# Patient Record
Sex: FEMALE | Race: WHITE | NOT HISPANIC OR LATINO | URBAN - METROPOLITAN AREA
[De-identification: names, ages, dates, MRNs, and addresses within clinical notes are randomized per-mention and may not be internally consistent; named-entity substitution may affect disease eponyms.]

---

## 2023-08-14 ENCOUNTER — ON CAMPUS - OUTPATIENT (OUTPATIENT)
Dept: URBAN - METROPOLITAN AREA HOSPITAL 105 | Facility: HOSPITAL | Age: 83
End: 2023-08-14
Payer: MEDICARE

## 2023-08-14 DIAGNOSIS — K44.9 DIAPHRAGMATIC HERNIA WITHOUT OBSTRUCTION OR GANGRENE: ICD-10-CM

## 2023-08-14 DIAGNOSIS — N18.30 CHRONIC KIDNEY DISEASE, STAGE 3 UNSPECIFIED: ICD-10-CM

## 2023-08-14 DIAGNOSIS — K21.9 GASTRO-ESOPHAGEAL REFLUX DISEASE WITHOUT ESOPHAGITIS: ICD-10-CM

## 2023-08-14 DIAGNOSIS — K31.89 OTHER DISEASES OF STOMACH AND DUODENUM: ICD-10-CM

## 2023-08-14 DIAGNOSIS — R13.10 DYSPHAGIA, UNSPECIFIED: ICD-10-CM

## 2023-08-14 PROCEDURE — 99222 1ST HOSP IP/OBS MODERATE 55: CPT | Mod: 25 | Performed by: NURSE PRACTITIONER

## 2023-08-14 PROCEDURE — 43239 EGD BIOPSY SINGLE/MULTIPLE: CPT | Performed by: INTERNAL MEDICINE

## 2023-08-21 ENCOUNTER — OFFICE (OUTPATIENT)
Dept: URBAN - METROPOLITAN AREA CLINIC 16 | Facility: CLINIC | Age: 83
End: 2023-08-21

## 2023-08-21 VITALS
HEART RATE: 65 BPM | DIASTOLIC BLOOD PRESSURE: 68 MMHG | WEIGHT: 145 LBS | OXYGEN SATURATION: 96 % | SYSTOLIC BLOOD PRESSURE: 124 MMHG

## 2023-08-21 DIAGNOSIS — R13.10 DYSPHAGIA, UNSPECIFIED: ICD-10-CM

## 2023-08-21 DIAGNOSIS — K21.9 GASTRO-ESOPHAGEAL REFLUX DISEASE WITHOUT ESOPHAGITIS: ICD-10-CM

## 2023-08-21 DIAGNOSIS — K44.9 DIAPHRAGMATIC HERNIA WITHOUT OBSTRUCTION OR GANGRENE: ICD-10-CM

## 2023-08-21 DIAGNOSIS — D64.9 ANEMIA, UNSPECIFIED: ICD-10-CM

## 2023-08-21 PROCEDURE — 99215 OFFICE O/P EST HI 40 MIN: CPT | Performed by: INTERNAL MEDICINE

## 2023-08-21 NOTE — SERVICEHPINOTES
Giselle Ashraf   is seen today for a follow-up visit.     Giselle presents with her daughter Eliza today and she was seen by my team in the hospital for dysphagia issues in the setting of reflux with some chronic medical conditions of CKD stage III hypertension hypothyroidism and glaucoma history otherwise. She had difficulty swallowing with cough and hoarseness that presented at that time and issues related to GERD as well and she did have an ENT evaluation laryngoscopy that morning that showed significant erythema and edema of the adenoid space secondary to reflux. She is 83 years old and imaging did not reveal acute findings for chest pain etc. she does have atherosclerosis and AC joint osteoarthritis Eliza she is still on the stomach medications good
br
br   She did have findings of iron deficiency and has been on iron infusions and that has helped her feelings of fatigue and weakness significant. She has improved from her issues with dysphagia and bringing up phlegm she did not have dilatation on that exam and no stricture or other finding noted but she does have evidence of hiatal hernia. She is clinically improved from a dysphagia standpoint

## 2023-08-21 NOTE — SERVICENOTES
Her goal in the future should be over the next several months to see if she can missed a dose of PPI and still not have symptoms if she does not have symptoms she may wean her medication down to once daily and do this long-term with a hiatal hernia history and reflux history.  If she has dysphagia or other alarm symptoms she may need to return to the hospital i.e. bleeding or chest pain etc.

## 2023-10-19 ENCOUNTER — OFFICE (OUTPATIENT)
Dept: URBAN - METROPOLITAN AREA CLINIC 16 | Facility: CLINIC | Age: 83
End: 2023-10-19

## 2023-10-19 VITALS
SYSTOLIC BLOOD PRESSURE: 122 MMHG | OXYGEN SATURATION: 98 % | DIASTOLIC BLOOD PRESSURE: 74 MMHG | HEART RATE: 61 BPM | HEIGHT: 63 IN | WEIGHT: 142 LBS

## 2023-10-19 DIAGNOSIS — R19.4 CHANGE IN BOWEL HABIT: ICD-10-CM

## 2023-10-19 DIAGNOSIS — K59.09 OTHER CONSTIPATION: ICD-10-CM

## 2023-10-19 PROCEDURE — 99214 OFFICE O/P EST MOD 30 MIN: CPT | Performed by: INTERNAL MEDICINE

## 2023-10-19 NOTE — SERVICEHPINOTES
Giselle Ashraf   is seen today for a follow-up visit.     Giselle presents today for referral and has issues with development of significant constipation that was sudden and she had inability to empty her rectum that was noted back in 1985 after rectal surgery. She had that issue after fissure surgery at that time. There was also some angulation that may have caused that issue as well from the surgical standpoint and she ended up having mesh to help, this was to straighten her bowels in layman's termsFrancesca has a history of GERD hiatal hernia and dysphagia and is doing excellent at this time. There was some concern even for over pharyngeal component.Antoni also reviewed her history at that time and colonoscopy had been greater than 10 years but she was 83 for history of colon polyps

## 2023-10-19 NOTE — SERVICENOTES
She will continue combination of MiraLAX 17 g in 8 ounces of liquid once or twice a day if needed continue Colace docusate and lactulose 10 g i.e. 1 tablespoon/day and water.  With her age she is okay without pursuant to examination and colonoscopy.  She can follow-up accordingly